# Patient Record
Sex: MALE | Race: WHITE | HISPANIC OR LATINO | Employment: OTHER | URBAN - METROPOLITAN AREA
[De-identification: names, ages, dates, MRNs, and addresses within clinical notes are randomized per-mention and may not be internally consistent; named-entity substitution may affect disease eponyms.]

---

## 2023-02-23 PROBLEM — S39.012A LUMBOSACRAL STRAIN: Status: ACTIVE | Noted: 2023-02-23

## 2023-02-23 PROBLEM — F64.9 GENDER DYSPHORIA: Status: ACTIVE | Noted: 2023-02-23

## 2023-02-23 PROBLEM — F32.A CLINICAL DEPRESSION: Status: ACTIVE | Noted: 2023-02-23

## 2023-02-23 PROBLEM — R21 RASH: Status: ACTIVE | Noted: 2023-02-23

## 2023-02-23 PROBLEM — M54.9 BACK PAIN, ACUTE: Status: ACTIVE | Noted: 2023-02-23

## 2023-02-23 PROBLEM — F10.10 ALCOHOL USE DISORDER, MILD, ABUSE: Status: ACTIVE | Noted: 2023-02-23

## 2023-02-23 PROBLEM — K52.9 ACUTE GASTROENTERITIS: Status: ACTIVE | Noted: 2023-02-23

## 2023-02-23 PROBLEM — F41.9 ANXIETY: Status: ACTIVE | Noted: 2023-02-23

## 2023-02-23 RX ORDER — SPIRONOLACTONE 50 MG/1
1 TABLET, FILM COATED ORAL DAILY
COMMUNITY
Start: 2021-12-17

## 2023-02-23 RX ORDER — IBUPROFEN 600 MG/1
1 TABLET ORAL EVERY 8 HOURS PRN
COMMUNITY
Start: 2021-01-20 | End: 2023-05-03 | Stop reason: ALTCHOICE

## 2023-02-23 RX ORDER — SYRINGE WITH NEEDLE, 1 ML 25GX5/8"
SYRINGE, EMPTY DISPOSABLE MISCELLANEOUS
COMMUNITY

## 2023-02-23 RX ORDER — CYCLOBENZAPRINE HCL 5 MG
1-2 TABLET ORAL 3 TIMES DAILY PRN
COMMUNITY
Start: 2021-01-20 | End: 2023-05-03 | Stop reason: ALTCHOICE

## 2023-02-23 RX ORDER — ESTRADIOL VALERATE 40 MG/ML
0.15 INJECTION INTRAMUSCULAR
COMMUNITY
Start: 2021-12-17 | End: 2023-05-10 | Stop reason: SDUPTHER

## 2023-02-23 RX ORDER — PROGESTERONE 200 MG/1
1 CAPSULE ORAL NIGHTLY
COMMUNITY
Start: 2022-03-23

## 2023-02-23 RX ORDER — ISOPROPYL ALCOHOL 70 ML/100ML
SWAB TOPICAL
COMMUNITY
End: 2023-05-03 | Stop reason: SDUPTHER

## 2023-03-06 ENCOUNTER — APPOINTMENT (OUTPATIENT)
Dept: PRIMARY CARE | Facility: CLINIC | Age: 38
End: 2023-03-06
Payer: COMMERCIAL

## 2023-03-15 ENCOUNTER — OFFICE VISIT (OUTPATIENT)
Dept: PRIMARY CARE | Facility: CLINIC | Age: 38
End: 2023-03-15
Payer: COMMERCIAL

## 2023-03-15 ENCOUNTER — APPOINTMENT (OUTPATIENT)
Dept: PRIMARY CARE | Facility: CLINIC | Age: 38
End: 2023-03-15
Payer: COMMERCIAL

## 2023-03-15 ENCOUNTER — LAB (OUTPATIENT)
Dept: LAB | Facility: LAB | Age: 38
End: 2023-03-15
Payer: COMMERCIAL

## 2023-03-15 VITALS
DIASTOLIC BLOOD PRESSURE: 78 MMHG | HEART RATE: 70 BPM | WEIGHT: 169 LBS | HEIGHT: 67 IN | BODY MASS INDEX: 26.53 KG/M2 | SYSTOLIC BLOOD PRESSURE: 118 MMHG | OXYGEN SATURATION: 98 % | TEMPERATURE: 97.8 F

## 2023-03-15 DIAGNOSIS — F41.9 ANXIETY: ICD-10-CM

## 2023-03-15 DIAGNOSIS — F64.9 GENDER DYSPHORIA: ICD-10-CM

## 2023-03-15 DIAGNOSIS — N63.21 MASS OF UPPER OUTER QUADRANT OF LEFT BREAST: ICD-10-CM

## 2023-03-15 DIAGNOSIS — F64.9 GENDER DYSPHORIA: Primary | ICD-10-CM

## 2023-03-15 LAB
ALANINE AMINOTRANSFERASE (SGPT) (U/L) IN SER/PLAS: 13 U/L (ref 10–52)
ALBUMIN (G/DL) IN SER/PLAS: 4.2 G/DL (ref 3.4–5)
ALKALINE PHOSPHATASE (U/L) IN SER/PLAS: 54 U/L (ref 33–120)
ANION GAP IN SER/PLAS: 11 MMOL/L (ref 10–20)
ASPARTATE AMINOTRANSFERASE (SGOT) (U/L) IN SER/PLAS: 12 U/L (ref 9–39)
BILIRUBIN TOTAL (MG/DL) IN SER/PLAS: 0.6 MG/DL (ref 0–1.2)
CALCIUM (MG/DL) IN SER/PLAS: 9.2 MG/DL (ref 8.6–10.6)
CARBON DIOXIDE, TOTAL (MMOL/L) IN SER/PLAS: 26 MMOL/L (ref 21–32)
CHLORIDE (MMOL/L) IN SER/PLAS: 103 MMOL/L (ref 98–107)
CREATININE (MG/DL) IN SER/PLAS: 0.75 MG/DL (ref 0.5–1.3)
ESTRADIOL (PG/ML) IN SER/PLAS: 197 PG/ML
GFR MALE: >90 ML/MIN/1.73M2
GLUCOSE (MG/DL) IN SER/PLAS: 101 MG/DL (ref 74–99)
POTASSIUM (MMOL/L) IN SER/PLAS: 4 MMOL/L (ref 3.5–5.3)
PROTEIN TOTAL: 6.8 G/DL (ref 6.4–8.2)
SODIUM (MMOL/L) IN SER/PLAS: 136 MMOL/L (ref 136–145)
TESTOSTERONE (NG/DL) IN SER/PLAS: <60 NG/DL (ref 240–1000)
UREA NITROGEN (MG/DL) IN SER/PLAS: 15 MG/DL (ref 6–23)

## 2023-03-15 PROCEDURE — 82670 ASSAY OF TOTAL ESTRADIOL: CPT

## 2023-03-15 PROCEDURE — 84403 ASSAY OF TOTAL TESTOSTERONE: CPT

## 2023-03-15 PROCEDURE — 36415 COLL VENOUS BLD VENIPUNCTURE: CPT

## 2023-03-15 PROCEDURE — 99213 OFFICE O/P EST LOW 20 MIN: CPT | Performed by: FAMILY MEDICINE

## 2023-03-15 PROCEDURE — 80053 COMPREHEN METABOLIC PANEL: CPT

## 2023-03-15 ASSESSMENT — PAIN SCALES - GENERAL: PAINLEVEL: 0-NO PAIN

## 2023-03-15 NOTE — PROGRESS NOTES
"Subjective   Patient ID: Cat Stafford is a 38 y.o. who presents for Follow-up.  HPI    Anxiety, depression, Alcohol use  - has not been able to continue therapy due to financial stressors  - Increased alcohol use around Thanksgiving, has remained elevated.   - Anxiety was particularly bad. Significant stressors. Not interested in medication or counseling at this time    Gender dysphoria  - Taking estradiol valerate 0.1ml weekly on Wednesdays, progesterone 200 mg at bedtime  - Last estradiol level 282, Testosterone 20 in December  - Previously stopped spironolactone, last visit we discussed resuming. Doesn't want to do so at this time  - Considering orchiectomy   - A lot of dysphoria at work, not able to come out. Has been searching for social outlets    Breast lump  - L sided lump  - Noticed this a couple weeks ago  - No change  - Not painful  - No family history of breast cancer    Review of Systems  10 point review of systems negative except as noted in HPI.      Objective     /78 (BP Location: Right arm, Patient Position: Sitting)   Pulse 70   Temp 36.6 °C (97.8 °F)   Ht 1.702 m (5' 7\")   Wt 76.7 kg (169 lb)   SpO2 98%   BMI 26.47 kg/m²    General: well appearing, no distress  CV: Regular rate and rhythm, no murmur  Lungs: Clear to auscultation bilaterally  Breast: L breast with 1-2 cm mobile well circumscribed nodule.   Abdomen: Soft, nontender, nondistended  Extremities: No edema noted  Psych: Appropriate mood and affect     Assessment/Plan   Problem List Items Addressed This Visit    None  39 yo F here for follow-up. Plan as follows:  #Anxiety, depression, alcohol use  - Recommended counseling, discussed SSRI. Declines at this time    #Gender dysphoria  - Encouraged continued exploration of social outlets and social affirmation. Continue current medication regimen. Check labs as ordered    #Breast lump  - Check ultrasound    RTC 2 months   "

## 2023-03-16 ENCOUNTER — APPOINTMENT (OUTPATIENT)
Dept: PRIMARY CARE | Facility: CLINIC | Age: 38
End: 2023-03-16
Payer: COMMERCIAL

## 2023-03-31 ENCOUNTER — APPOINTMENT (OUTPATIENT)
Dept: PRIMARY CARE | Facility: CLINIC | Age: 38
End: 2023-03-31
Payer: COMMERCIAL

## 2023-05-03 ENCOUNTER — OFFICE VISIT (OUTPATIENT)
Dept: PRIMARY CARE | Facility: CLINIC | Age: 38
End: 2023-05-03
Payer: COMMERCIAL

## 2023-05-03 VITALS
HEIGHT: 67 IN | HEART RATE: 94 BPM | WEIGHT: 161 LBS | OXYGEN SATURATION: 97 % | BODY MASS INDEX: 25.27 KG/M2 | TEMPERATURE: 97.7 F | SYSTOLIC BLOOD PRESSURE: 112 MMHG | DIASTOLIC BLOOD PRESSURE: 77 MMHG

## 2023-05-03 DIAGNOSIS — F64.9 GENDER DYSPHORIA: ICD-10-CM

## 2023-05-03 DIAGNOSIS — R21 RASH: ICD-10-CM

## 2023-05-03 DIAGNOSIS — M54.6 ACUTE RIGHT-SIDED THORACIC BACK PAIN: Primary | ICD-10-CM

## 2023-05-03 DIAGNOSIS — F10.10 ALCOHOL USE DISORDER, MILD, ABUSE: ICD-10-CM

## 2023-05-03 DIAGNOSIS — F41.9 ANXIETY: ICD-10-CM

## 2023-05-03 PROCEDURE — 99214 OFFICE O/P EST MOD 30 MIN: CPT | Performed by: FAMILY MEDICINE

## 2023-05-03 RX ORDER — SYRINGE W-NEEDLE,DISPOSAB,3 ML 25GX5/8"
SYRINGE, EMPTY DISPOSABLE MISCELLANEOUS
Qty: 12 EACH | Refills: 3 | Status: SHIPPED | OUTPATIENT
Start: 2023-05-03

## 2023-05-03 RX ORDER — CYCLOBENZAPRINE HCL 5 MG
5 TABLET ORAL NIGHTLY PRN
Qty: 30 TABLET | Refills: 0 | Status: SHIPPED | OUTPATIENT
Start: 2023-05-03 | End: 2023-07-02

## 2023-05-03 RX ORDER — ISOPROPYL ALCOHOL 70 ML/100ML
SWAB TOPICAL
Qty: 100 EACH | Refills: 1 | Status: SHIPPED | OUTPATIENT
Start: 2023-05-03

## 2023-05-03 RX ORDER — TRIAMCINOLONE ACETONIDE 1 MG/G
CREAM TOPICAL 2 TIMES DAILY
Qty: 30 G | Refills: 0 | Status: SHIPPED | OUTPATIENT
Start: 2023-05-03 | End: 2023-05-08 | Stop reason: SDUPTHER

## 2023-05-03 RX ORDER — LIDOCAINE 50 MG/G
1 PATCH TOPICAL DAILY
Qty: 30 PATCH | Refills: 3 | Status: SHIPPED | OUTPATIENT
Start: 2023-05-03 | End: 2024-05-02

## 2023-05-03 ASSESSMENT — PAIN SCALES - GENERAL: PAINLEVEL: 6

## 2023-05-03 NOTE — PROGRESS NOTES
"Subjective   Patient ID: Cat Stafford is a 38 y.o. who presents for Follow-up.  HPI    Anxiety, depression, Alcohol use  - Had to go to the ER for intoxication in March  - Since that time has not had any alcohol  - Has been focusing on healthy eating and exercise  - Anxiety remains pretty high, remains uninterested in medication or counseling at this time    Gender dysphoria  - Taking estradiol valerate 0.1ml weekly on Wednesdays, progesterone 200 mg at bedtime  - Last estradiol labs at goal  - Previously stopped spironolactone. Has considered resuming  - Continues to have significant dysphoria at work  - Has met some friends who she can speak about gender with, looking into going to a support group     Breast lump- See last visit for more information   - Due for ultrasound  - Symptoms unchanged     Hurt back 1-2 weeks ago  - Located in center of spine.   - Worse when lowers chin to chest  - Started 2 sundays ago when twisted back and pulled heavy objects  - Located in mid thoracic spine   - Tries to avoid movements that exacerbate it    Review of Systems  10 point review of systems negative except as noted in HPI.      Objective     /77 (BP Location: Left arm, Patient Position: Sitting)   Pulse 94   Temp 36.5 °C (97.7 °F) (Temporal)   Ht 1.702 m (5' 7\")   Wt 73 kg (161 lb)   SpO2 97%   BMI 25.22 kg/m²    General: well appearing, no distress  CV: Regular rate and rhythm, no murmur  Lungs: Clear to auscultation bilaterally  Abdomen: Soft, nontender, nondistended  Back: Tenderness mid-thoracic paraspinal muscles. No bony deformity or tenderness  Extremities: No edema noted  Psych: Appropriate mood and affect     Assessment/Plan   39 yo F here for follow-up. Plan as follows:    #Anxiety, depression, alcohol use  - Congratulated on alcohol cessation, encouraged continued cessation  - Consider counseling and/or SSRI    #Gender dysphoria  - Continue current medication regimen    #Breast lump  - Check " ultrasound    #Back pain  - Suspect muscle strain  - Discussed at home stretching exercises  - Lidocaine patches  - Flexeril PRN  - If no improvement consider imaging vs formal PT    RTC 2 months

## 2023-05-08 DIAGNOSIS — F64.9 GENDER DYSPHORIA: Primary | ICD-10-CM

## 2023-05-08 DIAGNOSIS — R21 RASH: ICD-10-CM

## 2023-05-08 RX ORDER — TRIAMCINOLONE ACETONIDE 1 MG/G
CREAM TOPICAL
Qty: 30 G | Refills: 0 | Status: SHIPPED | OUTPATIENT
Start: 2023-05-08

## 2023-05-10 RX ORDER — ESTRADIOL VALERATE 40 MG/ML
6 INJECTION INTRAMUSCULAR
Qty: 5 ML | Refills: 2 | Status: SHIPPED | OUTPATIENT
Start: 2023-05-10 | End: 2024-04-03 | Stop reason: SDUPTHER

## 2023-06-12 ENCOUNTER — APPOINTMENT (OUTPATIENT)
Dept: PRIMARY CARE | Facility: CLINIC | Age: 38
End: 2023-06-12
Payer: COMMERCIAL

## 2023-06-30 ENCOUNTER — TELEPHONE (OUTPATIENT)
Dept: PRIMARY CARE | Facility: CLINIC | Age: 38
End: 2023-06-30
Payer: COMMERCIAL

## 2023-06-30 DIAGNOSIS — N63.21 MASS OF UPPER OUTER QUADRANT OF LEFT BREAST: Primary | ICD-10-CM

## 2023-06-30 NOTE — TELEPHONE ENCOUNTER
Radiology Scheduling called stating that they need bilateral diagnostic mammogram scheduled as well

## 2023-07-19 LAB
ALANINE AMINOTRANSFERASE (SGPT) (U/L) IN SER/PLAS: 15 U/L (ref 10–52)
ALBUMIN (G/DL) IN SER/PLAS: 4.5 G/DL (ref 3.4–5)
ALKALINE PHOSPHATASE (U/L) IN SER/PLAS: 38 U/L (ref 33–120)
ANION GAP IN SER/PLAS: 12 MMOL/L (ref 10–20)
APPEARANCE, URINE: CLEAR
ASPARTATE AMINOTRANSFERASE (SGOT) (U/L) IN SER/PLAS: 16 U/L (ref 9–39)
BILIRUBIN TOTAL (MG/DL) IN SER/PLAS: 0.9 MG/DL (ref 0–1.2)
BILIRUBIN, URINE: NEGATIVE
BLOOD, URINE: NEGATIVE
CALCIUM (MG/DL) IN SER/PLAS: 9.7 MG/DL (ref 8.6–10.6)
CARBON DIOXIDE, TOTAL (MMOL/L) IN SER/PLAS: 25 MMOL/L (ref 21–32)
CHLORIDE (MMOL/L) IN SER/PLAS: 103 MMOL/L (ref 98–107)
CHOLESTEROL (MG/DL) IN SER/PLAS: 166 MG/DL (ref 0–199)
CHOLESTEROL IN HDL (MG/DL) IN SER/PLAS: 69.5 MG/DL
CHOLESTEROL/HDL RATIO: 2.4
COLOR, URINE: COLORLESS
CREATININE (MG/DL) IN SER/PLAS: 0.82 MG/DL (ref 0.5–1.3)
ESTRADIOL (PG/ML) IN SER/PLAS: 313 PG/ML
GFR MALE: >90 ML/MIN/1.73M2
GLUCOSE (MG/DL) IN SER/PLAS: 79 MG/DL (ref 74–99)
GLUCOSE, URINE: NEGATIVE MG/DL
KETONES, URINE: NEGATIVE MG/DL
LDL: 85 MG/DL (ref 0–99)
LEUKOCYTE ESTERASE, URINE: NEGATIVE
NITRITE, URINE: NEGATIVE
PH, URINE: 8 (ref 5–8)
POTASSIUM (MMOL/L) IN SER/PLAS: 4.1 MMOL/L (ref 3.5–5.3)
PROTEIN TOTAL: 6.8 G/DL (ref 6.4–8.2)
PROTEIN, URINE: NEGATIVE MG/DL
SODIUM (MMOL/L) IN SER/PLAS: 136 MMOL/L (ref 136–145)
SPECIFIC GRAVITY, URINE: 1 (ref 1–1.03)
TRIGLYCERIDE (MG/DL) IN SER/PLAS: 60 MG/DL (ref 0–149)
UREA NITROGEN (MG/DL) IN SER/PLAS: 12 MG/DL (ref 6–23)
UROBILINOGEN, URINE: <2 MG/DL (ref 0–1.9)
VLDL: 12 MG/DL (ref 0–40)

## 2023-07-21 DIAGNOSIS — N63.21 MASS OF UPPER OUTER QUADRANT OF LEFT BREAST: Primary | ICD-10-CM

## 2023-07-24 LAB
TESTOSTERONE FREE (CHAN): 1.4 PG/ML (ref 35–155)
TESTOSTERONE,TOTAL,LC-MS/MS: 20 NG/DL (ref 250–1100)

## 2023-09-13 ENCOUNTER — LAB (OUTPATIENT)
Dept: LAB | Facility: LAB | Age: 38
End: 2023-09-13
Payer: COMMERCIAL

## 2023-09-13 LAB
HIV 1/ 2 AG/AB SCREEN: NONREACTIVE
SYPHILIS TOTAL AB: NONREACTIVE

## 2023-09-14 LAB
CHLAMYDIA TRACH., AMPLIFIED: NEGATIVE
N. GONORRHEA, AMPLIFIED: NEGATIVE

## 2023-10-20 ENCOUNTER — TELEPHONE (OUTPATIENT)
Dept: PRIMARY CARE | Facility: CLINIC | Age: 38
End: 2023-10-20
Payer: COMMERCIAL

## 2023-10-25 ENCOUNTER — APPOINTMENT (OUTPATIENT)
Dept: PRIMARY CARE | Facility: CLINIC | Age: 38
End: 2023-10-25
Payer: COMMERCIAL

## 2023-10-27 ENCOUNTER — TELEMEDICINE (OUTPATIENT)
Dept: PRIMARY CARE | Facility: CLINIC | Age: 38
End: 2023-10-27
Payer: COMMERCIAL

## 2023-10-27 DIAGNOSIS — F32.A DEPRESSION, UNSPECIFIED DEPRESSION TYPE: ICD-10-CM

## 2023-10-27 DIAGNOSIS — F10.90 ALCOHOL USE DISORDER: ICD-10-CM

## 2023-10-27 DIAGNOSIS — F64.9 GENDER DYSPHORIA: Primary | ICD-10-CM

## 2023-10-27 DIAGNOSIS — F41.9 ANXIETY: ICD-10-CM

## 2023-10-27 PROCEDURE — 99214 OFFICE O/P EST MOD 30 MIN: CPT | Mod: 25 | Performed by: FAMILY MEDICINE

## 2023-10-27 PROCEDURE — 99214 OFFICE O/P EST MOD 30 MIN: CPT | Performed by: FAMILY MEDICINE

## 2023-11-01 ENCOUNTER — LAB (OUTPATIENT)
Dept: LAB | Facility: LAB | Age: 38
End: 2023-11-01
Payer: COMMERCIAL

## 2023-11-01 DIAGNOSIS — F64.9 GENDER DYSPHORIA: ICD-10-CM

## 2023-11-01 PROCEDURE — 84403 ASSAY OF TOTAL TESTOSTERONE: CPT

## 2023-11-01 PROCEDURE — 80053 COMPREHEN METABOLIC PANEL: CPT

## 2023-11-01 PROCEDURE — 82670 ASSAY OF TOTAL ESTRADIOL: CPT

## 2023-11-01 PROCEDURE — 36415 COLL VENOUS BLD VENIPUNCTURE: CPT

## 2023-11-02 LAB
ALBUMIN SERPL BCP-MCNC: 4.4 G/DL (ref 3.4–5)
ALP SERPL-CCNC: 59 U/L (ref 33–120)
ALT SERPL W P-5'-P-CCNC: 10 U/L (ref 7–52)
ANION GAP SERPL CALC-SCNC: 15 MMOL/L (ref 10–20)
AST SERPL W P-5'-P-CCNC: 14 U/L (ref 9–39)
BILIRUB SERPL-MCNC: 0.7 MG/DL (ref 0–1.2)
BUN SERPL-MCNC: 10 MG/DL (ref 6–23)
CALCIUM SERPL-MCNC: 9.3 MG/DL (ref 8.6–10.6)
CHLORIDE SERPL-SCNC: 105 MMOL/L (ref 98–107)
CO2 SERPL-SCNC: 22 MMOL/L (ref 21–32)
CREAT SERPL-MCNC: 0.73 MG/DL (ref 0.5–1.3)
ESTRADIOL SERPL-MCNC: 173 PG/ML
GFR SERPL CREATININE-BSD FRML MDRD: >90 ML/MIN/1.73M*2
GLUCOSE SERPL-MCNC: 99 MG/DL (ref 74–99)
POTASSIUM SERPL-SCNC: 3.8 MMOL/L (ref 3.5–5.3)
PROT SERPL-MCNC: 7.2 G/DL (ref 6.4–8.2)
SODIUM SERPL-SCNC: 138 MMOL/L (ref 136–145)
TESTOST SERPL-MCNC: <30 NG/DL (ref 0–1000)

## 2024-01-22 ENCOUNTER — HOSPITAL ENCOUNTER (OUTPATIENT)
Dept: RADIOLOGY | Facility: CLINIC | Age: 39
Discharge: HOME | End: 2024-01-22
Payer: COMMERCIAL

## 2024-01-22 DIAGNOSIS — R92.8 OTHER ABNORMAL AND INCONCLUSIVE FINDINGS ON DIAGNOSTIC IMAGING OF BREAST: ICD-10-CM

## 2024-01-22 PROCEDURE — 76642 ULTRASOUND BREAST LIMITED: CPT | Mod: LT

## 2024-01-22 PROCEDURE — 76642 ULTRASOUND BREAST LIMITED: CPT | Mod: LEFT SIDE | Performed by: STUDENT IN AN ORGANIZED HEALTH CARE EDUCATION/TRAINING PROGRAM

## 2024-01-22 PROCEDURE — 76982 USE 1ST TARGET LESION: CPT | Mod: LT

## 2024-01-23 DIAGNOSIS — N63.21 MASS OF UPPER OUTER QUADRANT OF LEFT BREAST: Primary | ICD-10-CM

## 2024-01-23 DIAGNOSIS — Z12.31 SCREENING MAMMOGRAM FOR BREAST CANCER: ICD-10-CM

## 2024-01-23 NOTE — PROGRESS NOTES
Mammogram (screening ) and U/S L ordered for 6 months short-term follow-up.  I left a general message the patient's voicemail and asked Cat  to follow-up Dr. Willett on her return.

## 2024-04-03 DIAGNOSIS — F64.9 GENDER DYSPHORIA: ICD-10-CM

## 2024-04-03 RX ORDER — ESTRADIOL VALERATE 40 MG/ML
6 INJECTION INTRAMUSCULAR
Qty: 5 ML | Refills: 2 | Status: SHIPPED | OUTPATIENT
Start: 2024-04-03

## 2024-06-26 ENCOUNTER — OFFICE VISIT (OUTPATIENT)
Dept: PRIMARY CARE | Facility: CLINIC | Age: 39
End: 2024-06-26
Payer: COMMERCIAL

## 2024-06-26 VITALS
HEIGHT: 67 IN | RESPIRATION RATE: 16 BRPM | BODY MASS INDEX: 30.29 KG/M2 | HEART RATE: 73 BPM | TEMPERATURE: 97.9 F | SYSTOLIC BLOOD PRESSURE: 121 MMHG | OXYGEN SATURATION: 96 % | WEIGHT: 193 LBS | DIASTOLIC BLOOD PRESSURE: 78 MMHG

## 2024-06-26 DIAGNOSIS — Z77.21 EXPOSURE TO POTENTIALLY HAZARDOUS BODY FLUIDS: ICD-10-CM

## 2024-06-26 DIAGNOSIS — F10.90 ALCOHOL USE DISORDER: ICD-10-CM

## 2024-06-26 DIAGNOSIS — F32.A DEPRESSION, UNSPECIFIED DEPRESSION TYPE: ICD-10-CM

## 2024-06-26 DIAGNOSIS — F64.9 GENDER DYSPHORIA: Primary | ICD-10-CM

## 2024-06-26 LAB
ALBUMIN SERPL BCP-MCNC: 4.2 G/DL (ref 3.4–5)
ALP SERPL-CCNC: 58 U/L (ref 33–120)
ALT SERPL W P-5'-P-CCNC: 16 U/L (ref 7–52)
ANION GAP SERPL CALC-SCNC: 14 MMOL/L (ref 10–20)
AST SERPL W P-5'-P-CCNC: 18 U/L (ref 9–39)
BILIRUB SERPL-MCNC: 0.5 MG/DL (ref 0–1.2)
BUN SERPL-MCNC: 6 MG/DL (ref 6–23)
CALCIUM SERPL-MCNC: 8.8 MG/DL (ref 8.6–10.6)
CHLORIDE SERPL-SCNC: 104 MMOL/L (ref 98–107)
CHOLEST SERPL-MCNC: 178 MG/DL (ref 0–199)
CHOLESTEROL/HDL RATIO: 1.8
CO2 SERPL-SCNC: 22 MMOL/L (ref 21–32)
CREAT SERPL-MCNC: 0.65 MG/DL (ref 0.5–1.3)
EGFRCR SERPLBLD CKD-EPI 2021: >90 ML/MIN/1.73M*2
ERYTHROCYTE [DISTWIDTH] IN BLOOD BY AUTOMATED COUNT: 12.1 % (ref 11.5–14.5)
ESTRADIOL SERPL-MCNC: 223 PG/ML
GLUCOSE SERPL-MCNC: 99 MG/DL (ref 74–99)
HCT VFR BLD AUTO: 36.4 % (ref 36–52)
HDLC SERPL-MCNC: 100.9 MG/DL
HGB BLD-MCNC: 12.6 G/DL (ref 12–17.5)
LDLC SERPL CALC-MCNC: 67 MG/DL
MCH RBC QN AUTO: 33.3 PG (ref 26–34)
MCHC RBC AUTO-ENTMCNC: 34.6 G/DL (ref 32–36)
MCV RBC AUTO: 96 FL (ref 80–100)
NON HDL CHOLESTEROL: 77 MG/DL (ref 0–149)
NRBC BLD-RTO: 0 /100 WBCS (ref 0–0)
PLATELET # BLD AUTO: 202 X10*3/UL (ref 150–450)
POTASSIUM SERPL-SCNC: 4 MMOL/L (ref 3.5–5.3)
PROT SERPL-MCNC: 7.2 G/DL (ref 6.4–8.2)
RBC # BLD AUTO: 3.78 X10*6/UL (ref 4–5.9)
SODIUM SERPL-SCNC: 136 MMOL/L (ref 136–145)
TESTOST SERPL-MCNC: 42 NG/DL (ref 0–1000)
TRIGL SERPL-MCNC: 49 MG/DL (ref 0–149)
VLDL: 10 MG/DL (ref 0–40)
WBC # BLD AUTO: 5.5 X10*3/UL (ref 4.4–11.3)

## 2024-06-26 PROCEDURE — 99214 OFFICE O/P EST MOD 30 MIN: CPT | Performed by: FAMILY MEDICINE

## 2024-06-26 PROCEDURE — 82670 ASSAY OF TOTAL ESTRADIOL: CPT | Performed by: FAMILY MEDICINE

## 2024-06-26 PROCEDURE — 80061 LIPID PANEL: CPT | Performed by: FAMILY MEDICINE

## 2024-06-26 PROCEDURE — 36415 COLL VENOUS BLD VENIPUNCTURE: CPT | Performed by: FAMILY MEDICINE

## 2024-06-26 PROCEDURE — 84403 ASSAY OF TOTAL TESTOSTERONE: CPT | Performed by: FAMILY MEDICINE

## 2024-06-26 PROCEDURE — 80053 COMPREHEN METABOLIC PANEL: CPT | Performed by: FAMILY MEDICINE

## 2024-06-26 PROCEDURE — 85027 COMPLETE CBC AUTOMATED: CPT | Performed by: FAMILY MEDICINE

## 2024-06-26 PROCEDURE — 87491 CHLMYD TRACH DNA AMP PROBE: CPT | Performed by: FAMILY MEDICINE

## 2024-06-26 RX ORDER — ESTRADIOL VALERATE 40 MG/ML
6 INJECTION INTRAMUSCULAR
Qty: 5 ML | Refills: 2 | Status: SHIPPED | OUTPATIENT
Start: 2024-06-30

## 2024-06-26 RX ORDER — PROGESTERONE 200 MG/1
200 CAPSULE ORAL NIGHTLY
Qty: 90 CAPSULE | Refills: 3 | Status: SHIPPED | OUTPATIENT
Start: 2024-06-26

## 2024-06-26 ASSESSMENT — PAIN SCALES - GENERAL: PAINLEVEL: 0-NO PAIN

## 2024-06-26 NOTE — PROGRESS NOTES
"Subjective   Patient ID: Cat Stafford is a 39 y.o. who presents for follow-up    HPI    Working as a , looking for a new job    Ankle injury- see prior visits for further information. Able to walk much better. Has some deformity and numbness. Hasn't tried to exercise      Alcohol use, anxiety, depression  - Worsened since the injury  - Previously discussed lexapro- she picked it up but didn't start  - Alcohol use continues to remain very high  - Not interested in AA meetings   - Not interested in therapy  - 5-10 drinks a day, sometimes more     Gender dysphoria  - Taking estradiol valerate 0.1 ml weekly on Wednesdays, progesterone 200 mg at bedtime (has been out of progesterone recently)  - Unable to tolerate spironolactone in the past  - Interested in orchiectomy, breast augmentation, FFS. Would like to start process towards these surgeries     Prep  - Previously discussed. She is not currently sexually active, but has been concerned about prior sexual encounters that were not intentional   - Prescribed Descovy in the past, not covered by insurance. Would like Truvada   - Interested in STD testing     Review of Systems  10 point review of systems negative except as noted in HPI.      Objective     /78   Pulse 73   Temp 36.6 °C (97.9 °F)   Resp 16   Ht 1.702 m (5' 7\")   Wt 87.5 kg (193 lb)   SpO2 96%   BMI 30.23 kg/m²    General: well appearing, no distress  Neck: No lymphadenopathy, no thyromegaly  CV: Regular rate and rhythm, no murmur  Lungs: Clear to auscultation bilaterally  Abdomen: Soft, nontender, nondistended  Extremities: No edema noted  Psych: Appropriate mood and affect        Assessment/Plan   40 yo F here for follow-up. Plan as follows:    #Anxiety, depression, alcohol use  - Discussed options, encouraged counseling, AA  - Discussed medications such as naltrexone- she will consider  - She will also consider SSRI    #Gender dysphoria  - Continue current medication regimen. Repeat labs  - " Will link with surgical teams     #PrEP  - Repeat labs and start PrEP    RTC 2 months or sooner as needed

## 2024-06-27 LAB
C TRACH RRNA SPEC QL NAA+PROBE: NEGATIVE
N GONORRHOEA DNA SPEC QL PROBE+SIG AMP: NEGATIVE

## 2024-07-01 DIAGNOSIS — Z77.21 EXPOSURE TO POTENTIALLY HAZARDOUS BODY FLUIDS: Primary | ICD-10-CM

## 2024-07-01 LAB
HIV 1+2 AB+HIV1 P24 AG SERPL QL IA: NONREACTIVE
TREPONEMA PALLIDUM IGG+IGM AB [PRESENCE] IN SERUM OR PLASMA BY IMMUNOASSAY: NONREACTIVE

## 2024-07-22 ENCOUNTER — HOSPITAL ENCOUNTER (OUTPATIENT)
Dept: RADIOLOGY | Facility: HOSPITAL | Age: 39
Discharge: HOME | End: 2024-07-22
Payer: COMMERCIAL

## 2024-07-22 VITALS — BODY MASS INDEX: 29.82 KG/M2 | WEIGHT: 190 LBS | HEIGHT: 67 IN

## 2024-07-22 DIAGNOSIS — N63.21 MASS OF UPPER OUTER QUADRANT OF LEFT BREAST: ICD-10-CM

## 2024-07-22 DIAGNOSIS — Z12.31 SCREENING MAMMOGRAM FOR BREAST CANCER: ICD-10-CM

## 2024-07-22 DIAGNOSIS — N63.21 UNSPECIFIED LUMP IN THE LEFT BREAST, UPPER OUTER QUADRANT: ICD-10-CM

## 2024-07-22 DIAGNOSIS — R92.8 OTHER ABNORMAL AND INCONCLUSIVE FINDINGS ON DIAGNOSTIC IMAGING OF BREAST: ICD-10-CM

## 2024-07-22 PROCEDURE — 77066 DX MAMMO INCL CAD BI: CPT | Performed by: STUDENT IN AN ORGANIZED HEALTH CARE EDUCATION/TRAINING PROGRAM

## 2024-07-22 PROCEDURE — 77062 BREAST TOMOSYNTHESIS BI: CPT | Performed by: STUDENT IN AN ORGANIZED HEALTH CARE EDUCATION/TRAINING PROGRAM

## 2024-07-22 PROCEDURE — 77062 BREAST TOMOSYNTHESIS BI: CPT

## 2024-07-22 PROCEDURE — 76982 USE 1ST TARGET LESION: CPT | Mod: LT

## 2024-07-22 PROCEDURE — 76642 ULTRASOUND BREAST LIMITED: CPT | Performed by: STUDENT IN AN ORGANIZED HEALTH CARE EDUCATION/TRAINING PROGRAM

## 2024-07-22 PROCEDURE — 76642 ULTRASOUND BREAST LIMITED: CPT | Mod: LT

## 2024-07-23 ENCOUNTER — APPOINTMENT (OUTPATIENT)
Dept: RADIOLOGY | Facility: HOSPITAL | Age: 39
End: 2024-07-23
Payer: COMMERCIAL

## 2024-08-30 ENCOUNTER — APPOINTMENT (OUTPATIENT)
Dept: PRIMARY CARE | Facility: CLINIC | Age: 39
End: 2024-08-30
Payer: COMMERCIAL

## 2024-09-06 ENCOUNTER — OFFICE VISIT (OUTPATIENT)
Dept: PRIMARY CARE | Facility: CLINIC | Age: 39
End: 2024-09-06
Payer: COMMERCIAL

## 2024-09-06 VITALS
TEMPERATURE: 97.3 F | DIASTOLIC BLOOD PRESSURE: 66 MMHG | HEART RATE: 87 BPM | OXYGEN SATURATION: 98 % | BODY MASS INDEX: 28.41 KG/M2 | RESPIRATION RATE: 14 BRPM | WEIGHT: 181 LBS | SYSTOLIC BLOOD PRESSURE: 106 MMHG | HEIGHT: 67 IN

## 2024-09-06 DIAGNOSIS — Z77.21 EXPOSURE TO POTENTIALLY HAZARDOUS BODY FLUIDS: ICD-10-CM

## 2024-09-06 DIAGNOSIS — F10.90 ALCOHOL USE DISORDER: ICD-10-CM

## 2024-09-06 DIAGNOSIS — F41.9 ANXIETY: ICD-10-CM

## 2024-09-06 DIAGNOSIS — F32.A DEPRESSION, UNSPECIFIED DEPRESSION TYPE: ICD-10-CM

## 2024-09-06 DIAGNOSIS — F64.9 GENDER DYSPHORIA: Primary | ICD-10-CM

## 2024-09-06 PROCEDURE — 90471 IMMUNIZATION ADMIN: CPT | Performed by: FAMILY MEDICINE

## 2024-09-06 PROCEDURE — 99214 OFFICE O/P EST MOD 30 MIN: CPT | Performed by: FAMILY MEDICINE

## 2024-09-06 PROCEDURE — 87389 HIV-1 AG W/HIV-1&-2 AB AG IA: CPT | Performed by: FAMILY MEDICINE

## 2024-09-06 PROCEDURE — 3008F BODY MASS INDEX DOCD: CPT | Performed by: FAMILY MEDICINE

## 2024-09-06 PROCEDURE — 36415 COLL VENOUS BLD VENIPUNCTURE: CPT | Performed by: FAMILY MEDICINE

## 2024-09-06 ASSESSMENT — PAIN SCALES - GENERAL: PAINLEVEL: 0-NO PAIN

## 2024-09-06 NOTE — PROGRESS NOTES
"Subjective   Patient ID: Cat Stafford is a 39 y.o. who presents for follow-up    HPI    Ankle injury improving   Recently got a new job- is going to be working as a     Alcohol use, anxiety, depression  - Stopped drinking 2-3 weeks ago  - Feeling well, no symptoms of withdrawal  - We have discussed naltrexone and/or SSRI- not interested at this time  - Counseling is cost prohibitive     Gender dysphoria  - Taking estradiol valerate 0.1 ml weekly on Wednesdays, progesterone 200 mg at bedtime  - Unable to tolerate spironolactone in the past  - Interested in orchiectomy, breast augmentation, FFS- was given information last visit and plans to call to schedule consultations      Prep  - Last visit we discussed starting Truvada. She picked it up but as not started yet. No recent sexual partners     Review of Systems  10 point review of systems negative except as noted in HPI.      Objective     /66   Pulse 87   Temp 36.3 °C (97.3 °F)   Resp 14   Ht 1.702 m (5' 7\")   Wt 82.1 kg (181 lb)   SpO2 98%   BMI 28.35 kg/m²    General: well appearing, no distress  Neck: No lymphadenopathy, no thyromegaly  CV: Regular rate and rhythm, no murmur  Lungs: Clear to auscultation bilaterally  Abdomen: Soft, nontender, nondistended  Extremities: No edema noted  Psych: Appropriate mood and affect        Assessment/Plan   38 yo F here for follow-up. Plan as follows:    #Anxiety, depression, alcohol use  - Congratulated on alcohol cessation. Offered further resources     #Gender dysphoria  - Continue current medication regimen. Repeat labs  - Will link with surgical teams     #PrEP  - Check HIV, if negative ok to start PrEP    RTC 2-3 months or sooner as needed   "

## 2024-09-07 LAB — HIV 1+2 AB+HIV1 P24 AG SERPL QL IA: NONREACTIVE

## 2024-09-26 ENCOUNTER — APPOINTMENT (OUTPATIENT)
Dept: PLASTIC SURGERY | Facility: CLINIC | Age: 39
End: 2024-09-26
Payer: COMMERCIAL

## 2024-09-27 NOTE — PROGRESS NOTES
Facial Plastic & Reconstructive Surgery  Dx: Gender Dysphoria  Chief Complaint: Facial Feminization    Referring Provider: Dr. Chilel, PCP    Garrett Stafford is a 39 y.o. transgender female whose PMHx is significant for gender dysphoria.  She has been diagnosed with gender dysphoria, a recognized medical condition by the Diagnostic and Statistical Manual of Mental Disorders (DSM-5). Gender dysphoria is a severe and clinically significant distress experienced by individuals when their gender identity differs from the sex assigned to them at birth. This condition has had a substantial negative impact on her daily life and psychological well-being and presents today for surgical consultation to discuss facial feminization surgery.      Patient's transition history:  Medical: Estrogen since 2020, progesterone since 2021    Surgical transitions: None yet, Bottom surgery consultation in 2021, could not get it approved, but now has new bottom surgery consult on 12/23/24 with Dr. Fang and looking into top surgery also.     Social support:  Main social support is friend Betsy, and uncle.  Betsy  or uncle will be caring for patient postoperatively.    Legal transition:  In process of changing name and gender legally.      Past Medical History  She has no past medical history on file.    Surgical History  She has no past surgical history on file.     Social History  She reports that she has quit smoking. Her smoking use included cigarettes. She does not have any smokeless tobacco history on file. She reports current alcohol use of about 3.0 standard drinks of alcohol per week. She reports current drug use. Drug: Marijuana.    Family History  Family History   Adopted: Yes        Allergies  Propoxyphene and Propoxyphene n-acetaminophen    Past, family, and social history obtained but not pertinent to current problem unless documented above.    ROS: All other systems have been reviewed and are negative for complaint unless  documented above.    UPPER FACE:  Hairline receding with male pattern baldness, Bossing, Frontal and orbital bone prominence  MIDFACE: Nasal skin type: Mod sebaceous. Tip: Mild bulbousness Tip rotation: Josefa Projection: Overprojected Dorsum: Dorsal hump,  Base width: Moderate Asymmetries: Dorsum hourglass shaped Alar columellar relationship: Wide  LOWER FACE: Wide, square jaw line, Prominent masseter muscle , Tracheal prominence    General Physical Exam:  General: Well-developed and well-nourished in appearance.  Skin: No rashes or concerning lesions on the visible portions of the skin.  Eyes: Extraocular movements intact. Visual fields grossly normal.  Ears: Pinna are normal in shape and position. External canals are patent.  Nose: Dorsum is midline. Septum is midline and turbinates are normal on anterior  rhinoscopy.  Oral Cavity/Oropharynx: Dentition is intact. Mucous membranes moist. No masses or  lesions. Tonsils are symmetric and non-obstructive.  Neck: Midline trachea without masses or lesions. Thyroid is normal in size.  Lymphatics: No palpable cervical lymphadenopathy  Respiratory: No respiratory distress. Quiet breathing without stertor or stridor.  Cardiovascular: Regular rate and rhythm. Warm extremities with equal pulses.  Psych: Normal mood and affect. Judgement and insight appropriate.  Neuro: Alert and oriented. CN II-XII grossly intact. No focal deficits.  Musculoskeletal: Gait intact. Moves all extremities well without apparent deformities.    Assessment: Garrett Stafford is a 39 y.o. transgender patient here today for facial feminization surgery consultation.     Gender dysphoria lending to significant distress and impairment in social, occupational, and other important areas of functioning.  History and Treatment:  The patient has a documented history of experiencing profound gender dysphoria, leading to considerable distress in their daily life.  They have undergone the treatments listed  above.  Despite these treatments, the persistent distress related to facial features continues to significantly impact their quality of life and mental health.    Benefit of Facial Feminization Surgery:  FFS is widely recognized as an effective and medically necessary treatment for alleviating gender dysphoria-related distress.  The surgery aims to bring congruence between an individual's gender identity and physical appearance, addressing the specific facial features causing distress.  Studies and clinical experience demonstrate that FFS results in significant improvements in mental health, quality of life, and overall well-being for individuals with gender dysphoria.  Surgical Plan:  A comprehensive treatment plan has been devised in collaboration with myself including    Frontorbital contouring ( possible setback)  Hairline augmentation and flap rotation, advancement  Browlift bilaterally  Feminization rhinoplasty  Jawline contouring, chin augmentation  Masseter debulking  Chondrolaryngoplasty (may be deferred if undergoing voice surgery)    Today, I was in direct face-to-face consultation with the patient, of which greater than 50% of the time was spent discussing the diagnoses, treatment plan, counseling, and care coordination. The patient and/or caregiver voiced understanding of our conversation and all questions were satisfactorily answered.    Total time for this visit: 45 min

## 2024-09-30 ENCOUNTER — APPOINTMENT (OUTPATIENT)
Dept: OTOLARYNGOLOGY | Facility: CLINIC | Age: 39
End: 2024-09-30
Payer: COMMERCIAL

## 2024-09-30 VITALS — BODY MASS INDEX: 27.59 KG/M2 | WEIGHT: 175.8 LBS | HEIGHT: 67 IN

## 2024-09-30 DIAGNOSIS — F64.9 GENDER DYSPHORIA: ICD-10-CM

## 2024-09-30 PROCEDURE — 3008F BODY MASS INDEX DOCD: CPT | Performed by: OTOLARYNGOLOGY

## 2024-09-30 PROCEDURE — 99204 OFFICE O/P NEW MOD 45 MIN: CPT | Performed by: OTOLARYNGOLOGY

## 2024-09-30 ASSESSMENT — PATIENT HEALTH QUESTIONNAIRE - PHQ9
SUM OF ALL RESPONSES TO PHQ9 QUESTIONS 1 AND 2: 0
2. FEELING DOWN, DEPRESSED OR HOPELESS: NOT AT ALL
1. LITTLE INTEREST OR PLEASURE IN DOING THINGS: NOT AT ALL

## 2024-10-14 ENCOUNTER — OFFICE VISIT (OUTPATIENT)
Dept: PLASTIC SURGERY | Facility: CLINIC | Age: 39
End: 2024-10-14
Payer: COMMERCIAL

## 2024-10-14 VITALS — HEIGHT: 67 IN | WEIGHT: 174 LBS | BODY MASS INDEX: 27.31 KG/M2

## 2024-10-14 DIAGNOSIS — F64.9 GENDER DYSPHORIA: ICD-10-CM

## 2024-10-14 PROCEDURE — 99203 OFFICE O/P NEW LOW 30 MIN: CPT | Performed by: PHYSICIAN ASSISTANT

## 2024-10-14 PROCEDURE — 99213 OFFICE O/P EST LOW 20 MIN: CPT | Performed by: PHYSICIAN ASSISTANT

## 2024-10-14 PROCEDURE — 3008F BODY MASS INDEX DOCD: CPT | Performed by: PHYSICIAN ASSISTANT

## 2024-10-14 NOTE — PROGRESS NOTES
"    Department of Plastic and Reconstructive Surgery           Initial Office Consultation    Date: 10/14/24  Referring Provider: Stacey Chilel MD  Primary Care Provider: Stacey Chilel MD    Destinee Stafford \"Cat\" is a 39 y.o. adult who was referred by Dr. Chilel for evaluation of gender affirming chest feminizing top surgery.    She presents today to discuss gender affirming breast augmentation. She endorses she has been on hormone therapy since 2020. She is currently still taking weekly Delestrogen injections and daily progesterone. She is most interested in subtle augmentation to aid in decreasing her dysphoria. She has breast tissue development from use of hormone therapy. She has a history of breast lump that was likely benign that is being followed with mammogram.     PMH: history of pneumothorax, anxiety, depression, gender dysphoria.   Surgical Hx: none  Family Hx: unknown family history   Smoking: non-smoker, non-nicotine user      Review of Systems   All other systems have been reviewed with the patient and have been found to be negative with exception to the chief complaint as mentioned in the history of present illness.    ROS: As noted in history of present illness    - CONSTITUTIONAL: Denies weight loss, fever and chills.  - HEENT: Denies changes in vision and hearing.  - RESPIRATORY: Denies SOB and cough, difficulty breathing  - CV: Denies palpitations and CP  - GI: Denies abdominal pain, nausea, vomiting and diarrhea.  - : Denies dysuria and urinary frequency.  - MSK: Denies myalgia and joint pain.  - SKIN: Denies rash and pruritus.  - NEUROLOGICAL: Denies headache and syncope.      Objective   Vital Signs: There were no vitals filed for this visit.    Gen: interactive and pleasant  Head: NCAT  Eyes: EOMI, PERRLA  Mouth: MMM  Throat: trachea midline  Cor: RRR  Pulm: nonlabored breathing  Abd: s/nt/nd  Neuro: AAOx3  Ext: extremities perfused    Focused exam of the: breast         " "                       R                  L  SN:NAC             23cm              24cm  NAC:IMF            7cm             8cm         BW                    14cm              14cm                                                                                      Ptosis Grade     1               1                                                       There is development of glandular breast tissue present bilaterally.        Imaging  Mammogram 7/22/2024  IMPRESSION:  1. Stable probably benign left breast mass. Final sonographic follow  up is recommended in 1 year when the patient is due for bilateral  mammography.      2. No mammographic evidence of malignancy in the right breast.      BI-RADS CATEGORY:  BI-RADS Category:  3 Probably Benign.    Procedures:     Assessment/Plan     Garrett Stafford \"Arun" is a 39 y.o. adult who was referred by Dr. Chilel for evaluation of gender affirming chest feminizing top surgery.    We reviewed the criteria for breast augmentation (implants/lipofilling) in Male to Female patients:    1. Persistent, well-documented gender dysphoria;  2. Capacity to make a fully informed decision and to consent for treatment;  3.  Age of majority in a given country (if younger, follow the SOC for children and adolescents);  4. If significant medical or mental health concerns are present, they must be reasonably well controlled.  5.  1 mental health letter of support    We also discussed that although not an explicit criterion, it is recommended that MtF patients undergo feminizing hormone therapy (minimum 12 months) prior to breast augmentation surgery. The purpose is to maximize breast growth in order to obtain better surgical (aesthetic) results. We performed in-bra sizing today and patient most liked sizes [phone]. We discussed today patient is to provide our office with letter of support from mental health care provider for gender affirming top surgery.       Plan:   Patient to obtain letter of " support documentation    I spent 40 minutes with this patient. Greater than 50% of this time was spent in the counselling and/or coordination of care of this patient.  This note was created using voice recognition software and was not corrected for typographical or grammatical errors.    Signature: Adele Patel PA-C

## 2024-10-15 ENCOUNTER — HOSPITAL ENCOUNTER (OUTPATIENT)
Dept: RADIOLOGY | Facility: CLINIC | Age: 39
Discharge: HOME | End: 2024-10-15
Payer: COMMERCIAL

## 2024-10-15 DIAGNOSIS — F64.9 GENDER DYSPHORIA: ICD-10-CM

## 2024-10-15 PROCEDURE — 70486 CT MAXILLOFACIAL W/O DYE: CPT

## 2024-10-29 DIAGNOSIS — Z77.21 EXPOSURE TO POTENTIALLY HAZARDOUS BODY FLUIDS: ICD-10-CM

## 2024-10-29 RX ORDER — EMTRICITABINE AND TENOFOVIR DISOPROXIL FUMARATE 200; 300 MG/1; MG/1
1 TABLET, FILM COATED ORAL DAILY
Qty: 30 TABLET | Refills: 2 | Status: SHIPPED | OUTPATIENT
Start: 2024-10-29 | End: 2025-10-29

## 2024-11-15 ENCOUNTER — OFFICE VISIT (OUTPATIENT)
Dept: PRIMARY CARE | Facility: CLINIC | Age: 39
End: 2024-11-15
Payer: COMMERCIAL

## 2024-11-15 VITALS
BODY MASS INDEX: 24.16 KG/M2 | WEIGHT: 159.4 LBS | DIASTOLIC BLOOD PRESSURE: 71 MMHG | HEIGHT: 68 IN | OXYGEN SATURATION: 98 % | HEART RATE: 86 BPM | RESPIRATION RATE: 16 BRPM | SYSTOLIC BLOOD PRESSURE: 105 MMHG | TEMPERATURE: 97.9 F

## 2024-11-15 DIAGNOSIS — F64.9 GENDER DYSPHORIA: Primary | ICD-10-CM

## 2024-11-15 DIAGNOSIS — F41.9 ANXIETY: ICD-10-CM

## 2024-11-15 DIAGNOSIS — F10.90 ALCOHOL USE DISORDER: ICD-10-CM

## 2024-11-15 PROCEDURE — 99214 OFFICE O/P EST MOD 30 MIN: CPT | Performed by: FAMILY MEDICINE

## 2024-11-15 PROCEDURE — 3008F BODY MASS INDEX DOCD: CPT | Performed by: FAMILY MEDICINE

## 2024-11-15 RX ORDER — PROGESTERONE 200 MG/1
200 CAPSULE ORAL NIGHTLY
Qty: 90 CAPSULE | Refills: 3 | Status: SHIPPED | OUTPATIENT
Start: 2024-11-15

## 2024-11-15 RX ORDER — ESTRADIOL VALERATE 40 MG/ML
6 INJECTION INTRAMUSCULAR
Qty: 5 ML | Refills: 2 | Status: SHIPPED | OUTPATIENT
Start: 2024-11-17

## 2024-11-15 SDOH — ECONOMIC STABILITY: FOOD INSECURITY: WITHIN THE PAST 12 MONTHS, THE FOOD YOU BOUGHT JUST DIDN'T LAST AND YOU DIDN'T HAVE MONEY TO GET MORE.: NEVER TRUE

## 2024-11-15 SDOH — ECONOMIC STABILITY: FOOD INSECURITY: WITHIN THE PAST 12 MONTHS, YOU WORRIED THAT YOUR FOOD WOULD RUN OUT BEFORE YOU GOT MONEY TO BUY MORE.: NEVER TRUE

## 2024-11-15 ASSESSMENT — ENCOUNTER SYMPTOMS
OCCASIONAL FEELINGS OF UNSTEADINESS: 0
DEPRESSION: 0
LOSS OF SENSATION IN FEET: 0

## 2024-11-15 ASSESSMENT — PATIENT HEALTH QUESTIONNAIRE - PHQ9
1. LITTLE INTEREST OR PLEASURE IN DOING THINGS: NOT AT ALL
SUM OF ALL RESPONSES TO PHQ9 QUESTIONS 1 AND 2: 0
2. FEELING DOWN, DEPRESSED OR HOPELESS: NOT AT ALL

## 2024-11-15 ASSESSMENT — LIFESTYLE VARIABLES: HOW OFTEN DO YOU HAVE SIX OR MORE DRINKS ON ONE OCCASION: LESS THAN MONTHLY

## 2024-11-15 ASSESSMENT — PAIN SCALES - GENERAL: PAINLEVEL_OUTOF10: 4

## 2024-11-15 NOTE — PROGRESS NOTES
"Subjective   Patient ID: Cat Stafford is a 39 y.o. who presents for follow-up    HPI    Headache  - Started Wednesday  - Not improved with tylenol or ibuprofen  - No need changes in lifestyle, except started supplement called CLA  - Increased stress   - Not much sleep   - Located as a band across forehead  - No nausea, vomiting, photophobia, phonophobia   - No trauma or cancer     Alcohol use, anxiety, depression  - Stopped drinking a few months ago, doing great with this  - Endorses increased anxiety- considering SSRI (has a script). Counseling is cost prohibitive      Gender dysphoria  - Taking estradiol valerate 0.1 ml weekly on Wednesdays, progesterone 200 mg at bedtime. Concerned that levels may be low  - Unable to tolerate spironolactone in the past  - Interested in orchiectomy, breast augmentation, FFS- has consultations underway     Previously discussed PrEP, not currently interested     Ankle injury improving   Recently got a new job- is going to be working as a     Review of Systems  10 point review of systems negative except as noted in HPI.      Objective     /71   Pulse 86   Temp 36.6 °C (97.9 °F) (Temporal)   Resp 16   Ht 1.727 m (5' 8\")   Wt 72.3 kg (159 lb 6.4 oz)   SpO2 98%   BMI 24.24 kg/m²    General: well appearing, no distress  Neck: No lymphadenopathy, no thyromegaly  CV: Regular rate and rhythm, no murmur  Lungs: Clear to auscultation bilaterally  Abdomen: Soft, nontender, nondistended  Extremities: No edema noted  Psych: Appropriate mood and affect  Neuro: Patient is alert and oriented X 3. Cranial nerves II-XII are grossly intact. Gait normal    Assessment/Plan   38 yo F here for follow-up. Plan as follows:    #Headache  - No red flag symptoms. Suspect tension headache 2/2 stress and poor sleep  - Will obtain CBC  - Encouraged hydration, rest, stress reduction if possible   - Discussed signs/symptoms that should prompt ER evaluation    #Anxiety, depression, alcohol use  - " Congratulated on continued avoidance of alcohol  - Encouraged considering SSRI    #Gender dysphoria  - Continue current medication regimen. Repeat labs  - Continue workup per surgical teams     RTC 2-3 months or sooner as needed

## 2024-11-20 ENCOUNTER — LAB (OUTPATIENT)
Dept: LAB | Facility: LAB | Age: 39
End: 2024-11-20
Payer: COMMERCIAL

## 2024-11-20 DIAGNOSIS — F64.9 GENDER DYSPHORIA: ICD-10-CM

## 2024-11-20 LAB
ALBUMIN SERPL BCP-MCNC: 4.4 G/DL (ref 3.4–5)
ALP SERPL-CCNC: 49 U/L (ref 33–120)
ALT SERPL W P-5'-P-CCNC: 28 U/L (ref 7–52)
ANION GAP SERPL CALC-SCNC: 16 MMOL/L (ref 10–20)
AST SERPL W P-5'-P-CCNC: 22 U/L (ref 9–39)
BASOPHILS # BLD AUTO: 0.05 X10*3/UL (ref 0–0.1)
BASOPHILS NFR BLD AUTO: 0.9 %
BILIRUB SERPL-MCNC: 0.6 MG/DL (ref 0–1.2)
BUN SERPL-MCNC: 14 MG/DL (ref 6–23)
CALCIUM SERPL-MCNC: 9.3 MG/DL (ref 8.6–10.6)
CHLORIDE SERPL-SCNC: 105 MMOL/L (ref 98–107)
CO2 SERPL-SCNC: 21 MMOL/L (ref 21–32)
CREAT SERPL-MCNC: 0.82 MG/DL (ref 0.5–1.3)
EGFRCR SERPLBLD CKD-EPI 2021: >90 ML/MIN/1.73M*2
EOSINOPHIL # BLD AUTO: 0.15 X10*3/UL (ref 0–0.7)
EOSINOPHIL NFR BLD AUTO: 2.7 %
ERYTHROCYTE [DISTWIDTH] IN BLOOD BY AUTOMATED COUNT: 12.3 % (ref 11.5–14.5)
ESTRADIOL SERPL-MCNC: 262 PG/ML
GLUCOSE SERPL-MCNC: 95 MG/DL (ref 74–99)
HCT VFR BLD AUTO: 38 % (ref 36–52)
HGB BLD-MCNC: 13.3 G/DL (ref 12–17.5)
IMM GRANULOCYTES # BLD AUTO: 0.02 X10*3/UL (ref 0–0.7)
IMM GRANULOCYTES NFR BLD AUTO: 0.4 % (ref 0–0.9)
LYMPHOCYTES # BLD AUTO: 0.85 X10*3/UL (ref 1.2–4.8)
LYMPHOCYTES NFR BLD AUTO: 15.5 %
MCH RBC QN AUTO: 31.8 PG (ref 26–34)
MCHC RBC AUTO-ENTMCNC: 35 G/DL (ref 32–36)
MCV RBC AUTO: 91 FL (ref 80–100)
MONOCYTES # BLD AUTO: 0.34 X10*3/UL (ref 0.1–1)
MONOCYTES NFR BLD AUTO: 6.2 %
NEUTROPHILS # BLD AUTO: 4.08 X10*3/UL (ref 1.2–7.7)
NEUTROPHILS NFR BLD AUTO: 74.3 %
NRBC BLD-RTO: 0 /100 WBCS (ref 0–0)
PLATELET # BLD AUTO: 200 X10*3/UL (ref 150–450)
POTASSIUM SERPL-SCNC: 4 MMOL/L (ref 3.5–5.3)
PROT SERPL-MCNC: 7 G/DL (ref 6.4–8.2)
RBC # BLD AUTO: 4.18 X10*6/UL (ref 4–5.9)
SODIUM SERPL-SCNC: 138 MMOL/L (ref 136–145)
TESTOST SERPL-MCNC: <30 NG/DL (ref 0–1000)
WBC # BLD AUTO: 5.5 X10*3/UL (ref 4.4–11.3)

## 2024-11-20 PROCEDURE — 85025 COMPLETE CBC W/AUTO DIFF WBC: CPT

## 2024-11-20 PROCEDURE — 36415 COLL VENOUS BLD VENIPUNCTURE: CPT

## 2024-11-20 PROCEDURE — 80053 COMPREHEN METABOLIC PANEL: CPT

## 2024-11-20 PROCEDURE — 82670 ASSAY OF TOTAL ESTRADIOL: CPT

## 2024-11-20 PROCEDURE — 84403 ASSAY OF TOTAL TESTOSTERONE: CPT

## 2024-12-04 ENCOUNTER — TELEPHONE (OUTPATIENT)
Dept: UROLOGY | Facility: CLINIC | Age: 39
End: 2024-12-04
Payer: COMMERCIAL

## 2024-12-04 NOTE — TELEPHONE ENCOUNTER
"Pre-Consult Phone Call    Verified patient by name and date of birth.    Confirmed consult needed for urological reconstruction.    Patient interested in orchiectomy and vaginoplasty     Information provided TGD questionnaire and Letters of support, surgery options.     HRT >1 year 2020     Nicotine Quit >1 year      Height 5' 8\"    Weight approx 159 lbs    BMI approx 24    DM2 Never    Hair Removal None    Ensured patient is aware that letters of support are valid for one year and that we cannot schedule their surgery until we have received copies of both letters.    Scheduled with Dr Chaidez    Encouraged patient to become familiar with their insurance policy, coverage and financial responsibility.    Addressed patient's questions.    Encouraged patient to contact the team with any other questions or concerns.  "

## 2024-12-19 LAB
ALBUMIN SERPL BCP-MCNC: 4.2 G/DL (ref 3.4–5)
ALBUMIN SERPL BCP-MCNC: 4.4 G/DL (ref 3.4–5)
ALBUMIN SERPL BCP-MCNC: 4.4 G/DL (ref 3.4–5)
ALP SERPL-CCNC: 49 U/L (ref 33–110)
ALP SERPL-CCNC: 58 U/L (ref 33–110)
ALP SERPL-CCNC: 59 U/L (ref 33–110)
ALT SERPL W P-5'-P-CCNC: 10 U/L (ref 7–45)
ALT SERPL W P-5'-P-CCNC: 16 U/L (ref 7–45)
ALT SERPL W P-5'-P-CCNC: 28 U/L (ref 7–45)
ANION GAP SERPL CALC-SCNC: 14 MMOL/L (ref 10–20)
ANION GAP SERPL CALC-SCNC: 15 MMOL/L (ref 10–20)
ANION GAP SERPL CALC-SCNC: 16 MMOL/L (ref 10–20)
AST SERPL W P-5'-P-CCNC: 14 U/L (ref 9–39)
AST SERPL W P-5'-P-CCNC: 18 U/L (ref 9–39)
AST SERPL W P-5'-P-CCNC: 22 U/L (ref 9–39)
BASOPHILS # BLD AUTO: 0.05 X10*3/UL (ref 0–0.1)
BASOPHILS NFR BLD AUTO: 0.9 %
BILIRUB SERPL-MCNC: 0.5 MG/DL (ref 0–1.2)
BILIRUB SERPL-MCNC: 0.6 MG/DL (ref 0–1.2)
BILIRUB SERPL-MCNC: 0.7 MG/DL (ref 0–1.2)
BUN SERPL-MCNC: 10 MG/DL (ref 6–23)
BUN SERPL-MCNC: 14 MG/DL (ref 6–23)
BUN SERPL-MCNC: 6 MG/DL (ref 6–23)
CALCIUM SERPL-MCNC: 8.8 MG/DL (ref 8.6–10.6)
CALCIUM SERPL-MCNC: 9.3 MG/DL (ref 8.6–10.6)
CALCIUM SERPL-MCNC: 9.3 MG/DL (ref 8.6–10.6)
CHLORIDE SERPL-SCNC: 104 MMOL/L (ref 98–107)
CHLORIDE SERPL-SCNC: 105 MMOL/L (ref 98–107)
CHLORIDE SERPL-SCNC: 105 MMOL/L (ref 98–107)
CO2 SERPL-SCNC: 21 MMOL/L (ref 21–32)
CO2 SERPL-SCNC: 22 MMOL/L (ref 21–32)
CO2 SERPL-SCNC: 22 MMOL/L (ref 21–32)
CREAT SERPL-MCNC: 0.65 MG/DL (ref 0.5–1.05)
CREAT SERPL-MCNC: 0.73 MG/DL (ref 0.5–1.05)
CREAT SERPL-MCNC: 0.82 MG/DL (ref 0.5–1.05)
EGFRCR SERPLBLD CKD-EPI 2021: >90 ML/MIN/1.73M*2
EOSINOPHIL # BLD AUTO: 0.15 X10*3/UL (ref 0–0.7)
EOSINOPHIL NFR BLD AUTO: 2.7 %
ERYTHROCYTE [DISTWIDTH] IN BLOOD BY AUTOMATED COUNT: 12.1 % (ref 11.5–14.5)
ERYTHROCYTE [DISTWIDTH] IN BLOOD BY AUTOMATED COUNT: 12.3 % (ref 11.5–14.5)
GLUCOSE SERPL-MCNC: 95 MG/DL (ref 74–99)
GLUCOSE SERPL-MCNC: 99 MG/DL (ref 74–99)
GLUCOSE SERPL-MCNC: 99 MG/DL (ref 74–99)
HCT VFR BLD AUTO: 36.4 % (ref 36–46)
HCT VFR BLD AUTO: 38 % (ref 36–46)
HGB BLD-MCNC: 12.6 G/DL (ref 12–16)
HGB BLD-MCNC: 13.3 G/DL (ref 12–16)
IMM GRANULOCYTES # BLD AUTO: 0.02 X10*3/UL (ref 0–0.7)
IMM GRANULOCYTES NFR BLD AUTO: 0.4 % (ref 0–0.9)
LYMPHOCYTES # BLD AUTO: 0.85 X10*3/UL (ref 1.2–4.8)
LYMPHOCYTES NFR BLD AUTO: 15.5 %
MCH RBC QN AUTO: 31.8 PG (ref 26–34)
MCH RBC QN AUTO: 33.3 PG (ref 26–34)
MCHC RBC AUTO-ENTMCNC: 34.6 G/DL (ref 32–36)
MCHC RBC AUTO-ENTMCNC: 35 G/DL (ref 32–36)
MCV RBC AUTO: 91 FL (ref 80–100)
MCV RBC AUTO: 96 FL (ref 80–100)
MONOCYTES # BLD AUTO: 0.34 X10*3/UL (ref 0.1–1)
MONOCYTES NFR BLD AUTO: 6.2 %
NEUTROPHILS # BLD AUTO: 4.08 X10*3/UL (ref 1.2–7.7)
NEUTROPHILS NFR BLD AUTO: 74.3 %
NRBC BLD-RTO: 0 /100 WBCS (ref 0–0)
NRBC BLD-RTO: 0 /100 WBCS (ref 0–0)
PLATELET # BLD AUTO: 200 X10*3/UL (ref 150–450)
PLATELET # BLD AUTO: 202 X10*3/UL (ref 150–450)
POTASSIUM SERPL-SCNC: 3.8 MMOL/L (ref 3.5–5.3)
POTASSIUM SERPL-SCNC: 4 MMOL/L (ref 3.5–5.3)
POTASSIUM SERPL-SCNC: 4 MMOL/L (ref 3.5–5.3)
PROT SERPL-MCNC: 7 G/DL (ref 6.4–8.2)
PROT SERPL-MCNC: 7.2 G/DL (ref 6.4–8.2)
PROT SERPL-MCNC: 7.2 G/DL (ref 6.4–8.2)
RBC # BLD AUTO: 3.78 X10*6/UL (ref 4–5.2)
RBC # BLD AUTO: 4.18 X10*6/UL (ref 4–5.2)
SODIUM SERPL-SCNC: 136 MMOL/L (ref 136–145)
SODIUM SERPL-SCNC: 138 MMOL/L (ref 136–145)
SODIUM SERPL-SCNC: 138 MMOL/L (ref 136–145)
TESTOST SERPL-MCNC: 42 NG/DL (ref 0–70)
TESTOST SERPL-MCNC: <30 NG/DL (ref 0–70)
TESTOST SERPL-MCNC: <30 NG/DL (ref 0–70)
WBC # BLD AUTO: 5.5 X10*3/UL (ref 4.4–11.3)
WBC # BLD AUTO: 5.5 X10*3/UL (ref 4.4–11.3)

## 2024-12-23 ENCOUNTER — APPOINTMENT (OUTPATIENT)
Dept: UROLOGY | Facility: CLINIC | Age: 39
End: 2024-12-23
Payer: COMMERCIAL

## 2024-12-23 VITALS
WEIGHT: 160 LBS | SYSTOLIC BLOOD PRESSURE: 106 MMHG | HEIGHT: 68 IN | DIASTOLIC BLOOD PRESSURE: 72 MMHG | BODY MASS INDEX: 24.25 KG/M2 | HEART RATE: 79 BPM | TEMPERATURE: 97.2 F

## 2024-12-23 DIAGNOSIS — F64.9 GENDER DYSPHORIA: Primary | ICD-10-CM

## 2024-12-23 PROCEDURE — 3008F BODY MASS INDEX DOCD: CPT | Performed by: UROLOGY

## 2024-12-23 PROCEDURE — 99215 OFFICE O/P EST HI 40 MIN: CPT | Performed by: UROLOGY

## 2024-12-23 ASSESSMENT — PAIN SCALES - GENERAL: PAINLEVEL_OUTOF10: 0-NO PAIN

## 2024-12-23 NOTE — PROGRESS NOTES
"   Urology Trans & Gender-Diverse Questionnaire    Question 12/5/2024 10:43 PM EST - Filed by Patient   1. WHAT NAME DO YOU GO BY? \"Cat\" (or more formally Michelle)   2. WHAT PRONOUNS DO YOU PREFER? She/her   3. WHAT IS YOUR IDENTIFIED GENDER? Female   4. WHEN DID YOU FIRST FEEL LIKE YOU WERE IN THE WRONG BODY OR IDENTIFIED WITH ANOTHER GENDER? 4-5 years old   5. AT WHAT AGE DID YOU FIRST EXPERIENCE DYSPHORIA? 12-13? Maybe younger but definitely male puberty starting wasn't wanted.   6. WHAT PART OF YOUR BODY GIVES YOU THE MOST DYSPHORIA? (PENIS - TESTICLES - VAGINA - BREASTS - CHEST - JAWLINE - PETAR’S APPLE - VOICE,OTHER) Petar's Apple, Forehead/Eye area, Face, Chin/Jaw, Testicles,  Chest & Arms (Muscle), Feet   7. WHEN DID YOU SOCIALLY TRANSITION? IN OTHER WORDS, WHEN DID YOU START CONSISTENTLY PRESENTING TO YOUR FRIENDS AND/OR FAMILY IN YOUR IDENTIFIED GENDER? Late 2021   8. WHEN DID YOU HORMONALLY TRANSITION? IN OTHER WORDS, WHEN DID YOU START TAKING GENDER AFFIRMING HORMONE THERAPY SUCH AS TESTOSTERONE OR ESTROGEN? Late 2020   9. DID YOU EVER TAKE PUBERTY BLOCKERS TO DELAY OR PREVENT THE ONSET OF PUBERTY? No, but I wish I could've!   10. WHO IS YOUR CURRENT HORMONE PROVIDER? Dr. Chilel   11. DO YOU HAVE AN ESTABLISHED RELATIONSHIP WITH A THERAPIST AND/OR OTHER MENTAL HEALTH PROVIDER? IF SO, PLEASE PROVIDE THEIR NAME. Nothing ongoing but I recently virtually visited with Jyoti Melendrez from [facility]   12. PLEASE LIST ANY HISTORY OF MENTAL HEALTH CHALLENGES (NONE-ANXIETY - DEPRESSION - BIPOLAR-SUICIDAL THOUGHTS-SUICIDAL ATTEMPT-DISSOCIATIVE DISORDER-SCHIZOPHRENIA-OTHER) Anxiety, Depression, Suicidal Thoughts. Unsure on the rest.   13. WHO ARE YOU SEXUALLY ATTRACTED TO? All genders.   14. DO YOU CURRENTLY HAVE A PARTNER(S)? No.   15. WHAT DO YOU HOPE TO ACHIEVE FROM GENDER AFFIRMING BOTTOM SURGERY? (ADDRESS DYSPHORIA - SEXUAL FUNCTION - RECEPTIVE VAGINAL INTERCOURSE -PENETRATIVE INTERCOURSE - STANDING TO PEE, " OTHER) Lowered or no testosterone in my system through removal of testicles. Feeling like I'd prefer not having that part on my body and increased comfort and presentability of tucking. For potential vaginoplasty, a body that doesn't include a penis.   16. ANY PREVIOUS GENDER AFFIRMING SURGERIES? IF SO,PLEASE SPECIFY. No   17. ANY OTHER MEDICAL HISTORY? (NONE - DIABETES - HIGH BLOOD PRESSURE - HEART DISEASE OTHER) No   18. ANY NICOTINE USE IN ANY FORM? Extremely rare >1-2 per year. Basically none.   19. ANY MARIJUANA/THC/CBD USE IN ANY FORM? Yes but not often and use little. Smoking, capsules, tinctures, edibles.   20. ANY OTHER RECREATIONAL DRUG USE? Very rare >1x year if at all. I do not seek it out or use regularly.   21. ANY ALCOHOL CONSUMPTION? None in 4 months but heavily before that.   22. HAIR REMOVAL - WHERE ON YOUR BODY, HOW (LASER OR ELECTROLYSIS, ETC.), HOW MANY SESSIONS? Laser on face, 20+ sessions.   23. DO YOU HAVE ANY CHILDREN? No   24. ARE YOU INTERESTED IN FERTILITY PRESERVATION? Already done but need an update     Subjective:  - Interested in gender affirming surgery, specifically orchiectomy, facial feminisation and breast augmentation  - Has been on hormones since late 2020  - Seen by Dr Chilel since 2019/2020  - Wants to have surgery in 2025  - Concerned about affordability and taking time off work  - Works in [employer] at [employer]  - Lives in [place], on 4th floor of apartment building with broken elevator    Past Medical History:  - On hormones since late 2020    Objective:    Assessment & Plan:  1. Gender dysphoria  - Stable diagnosis  - Requires 2 letters from mental health professionals, 1 already obtained, 2nd appointment booked for Jan 2025  - Discussed requirements for letters: document established relationship, stable diagnosis, living in affirmed gender, and would benefit from surgery  - Patient to follow up with previous letter writer to obtain updated letter  - Referral to in-house  therapist offered if needed for 2nd letter    2. Orchiectomy  - Outpatient procedure, takes 30 mins  - 5-7 days recovery, 3 days off work recommended  - Tight underwear for 5-7 days post-op  - Needs someone to drive to and from hospital and assist getting to 4th floor apartment post-op  - Once letters obtained, surgery can be booked within 4-6 weeks  - Patient can resume normal activities after 7 days    3. Vaginoplasty  - 5-6 day hospital stay  - 4-6 hour surgery  - 2 month recovery period, 2-2.5 months off work  - Requires 24/7 assistance for 2 months post-op  - Patient feels may not be feasible currently due to recovery time and assistance needed    4. Plan  - Patient to obtain 2 letters from mental health professionals  - Book orchiectomy once letters received  - Consider vaginoplasty in future if feasible

## 2025-01-13 ENCOUNTER — APPOINTMENT (OUTPATIENT)
Dept: UROLOGY | Facility: CLINIC | Age: 40
End: 2025-01-13
Payer: COMMERCIAL

## 2025-01-13 DIAGNOSIS — F64.9 GENDER DYSPHORIA: Primary | ICD-10-CM

## 2025-01-13 PROCEDURE — 90791 PSYCH DIAGNOSTIC EVALUATION: CPT | Performed by: PSYCHOLOGIST
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
